# Patient Record
Sex: FEMALE | Race: WHITE | URBAN - METROPOLITAN AREA
[De-identification: names, ages, dates, MRNs, and addresses within clinical notes are randomized per-mention and may not be internally consistent; named-entity substitution may affect disease eponyms.]

---

## 2017-01-23 RX ORDER — CITALOPRAM HYDROBROMIDE 20 MG/1
TABLET ORAL
Qty: 90 TABLET | Refills: 1 | OUTPATIENT
Start: 2017-01-23

## 2017-01-23 NOTE — TELEPHONE ENCOUNTER
MD already sent in for a years worth back on 12/30/16 to same pharmacy  Confirmed pharmacy had it , and they do.     Tammi Sanches RN  January 23, 2017 9:37 AM

## 2017-05-18 DIAGNOSIS — Z53.9 ERRONEOUS ENCOUNTER--DISREGARD: Primary | ICD-10-CM

## 2017-05-18 DIAGNOSIS — F33.1 MAJOR DEPRESSIVE DISORDER, RECURRENT EPISODE, MODERATE (H): ICD-10-CM

## 2017-05-25 NOTE — TELEPHONE ENCOUNTER
citalopram (CELEXA) 20 MG tablet     Last Written Prescription Date: 12/30/2016  Last Fill Quantity: 90, # refills: 3  Last Office Visit with [facility] primary care provider:  12/30/2017 with Dr Rios        Last PHQ-9 score on record=   PHQ-9 SCORE 1/2/2017   Total Score -   Total Score 0

## 2017-06-02 ENCOUNTER — OFFICE VISIT (OUTPATIENT)
Dept: FAMILY MEDICINE | Facility: CLINIC | Age: 32
End: 2017-06-02

## 2017-06-02 VITALS
HEART RATE: 83 BPM | WEIGHT: 127 LBS | BODY MASS INDEX: 19.65 KG/M2 | SYSTOLIC BLOOD PRESSURE: 105 MMHG | DIASTOLIC BLOOD PRESSURE: 72 MMHG | OXYGEN SATURATION: 97 %

## 2017-06-02 DIAGNOSIS — Z23 IMMUNIZATION DUE: ICD-10-CM

## 2017-06-02 DIAGNOSIS — N63.20 LUMP OF BREAST, LEFT: ICD-10-CM

## 2017-06-02 DIAGNOSIS — Z20.2 EXPOSURE TO STD: Primary | ICD-10-CM

## 2017-06-02 LAB — HIV RAPID ABY SCREEN: NEGATIVE

## 2017-06-02 ASSESSMENT — PAIN SCALES - GENERAL: PAINLEVEL: NO PAIN (0)

## 2017-06-02 NOTE — NURSING NOTE
Chief Complaint   Patient presents with     STD     patient would like testing     Breast Mass     left breast     32 year old      Blood pressure 105/72, pulse 83, weight 127 lb (57.6 kg), last menstrual period 05/27/2017, SpO2 97 %. Body mass index is 19.65 kg/(m^2).    Wt Readings from Last 2 Encounters:   06/02/17 127 lb (57.6 kg)   12/30/16 129 lb (58.5 kg)     BP Readings from Last 3 Encounters:   06/02/17 105/72   12/30/16 109/75   07/29/16 107/69       Patient Active Problem List   Diagnosis     Menorrhagia     Constipation     Seasonal allergic rhinitis     External hemorrhoids     TMJ (temporomandibular joint syndrome)       Current Outpatient Prescriptions   Medication Sig Dispense Refill     citalopram (CELEXA) 20 MG tablet Take 1 tablet (20 mg) by mouth daily 90 tablet 3     Multiple Vitamin (MULTI VITAMIN DAILY PO)        Lactobacillus (ACIDOPHILUS PO)          Social History   Substance Use Topics     Smoking status: Never Smoker     Smokeless tobacco: Not on file     Alcohol use Yes      Comment: socially         Health Maintenance Due   Topic Date Due     MAMMO Q2 YR  08/11/2016     PHQ-9 Q6 MONTHS  06/30/2017       AFSANEH HUGO CMA  June 2, 2017 2:56 PM

## 2017-06-02 NOTE — PROGRESS NOTES
Sonya is a 32 year old female who presents to [facility] for STD screening  1)  STD check - couple new partners. No vaginal symptoms  2)  Major depression: On citalopram 20 mg and feels good.  3)  Left breast fibroadenma on US.  Last imaging done 2014 and at that time advised 12 month follow-up but no imaging has been todate.  ROS:   General: depression is much better  Head/Eyes: none  Ears/Nose/Throat: drainage.   Breast: hx of fibroadenomas  Genitourinary: depends on what and how she eats.   Sexual Function: none  Endocrine: none  PROBLEM LIST:  Patient Active Problem List   Diagnosis     Menorrhagia     Constipation     Seasonal allergic rhinitis     External hemorrhoids     TMJ (temporomandibular joint syndrome)   OB/GYN HISTORY:   G0  LMP: regular  No contraception  No history of abnormal pap.   PAST MEDICAL HISTORY:  Past Medical History:   Diagnosis Date     Depression      Fibroadenoma of breast 2009    left breast   Life Style Modifiers:   Tobacco:  reports that she has never smoked. She does not have any smokeless tobacco history on file.   Alcohol:  reports that she drinks alcohol.   Drug use:  reports that she does not use illicit drugs.  Exercise: no structure to days         Diet: good [hard to keep on a schedule]  Supplements: no probiotics [hard to get in a routine]  PAST SURGICAL HISTORY:  Past Surgical History:   Procedure Laterality Date     pectoralis excavation  2002, 2009     TONSILLECTOMY  2009   FAMILY HISTORY:  Family History   Problem Relation Age of Onset     Coronary Artery Disease Paternal Grandmother    SOCIAL HISTORY:  Artist. Contract. Mostly puppeterring. Will go to [place] this fall and visit a friend.    MEDICATIONS:  Current Outpatient Prescriptions   Medication Sig Dispense Refill     citalopram (CELEXA) 20 MG tablet Take 1 tablet (20 mg) by mouth daily 90 tablet 3     Multiple Vitamin (MULTI VITAMIN DAILY PO)        Lactobacillus (ACIDOPHILUS PO)      ALLERGIES:  Review of patient's  allergies indicates no known allergies.  VITALS:  /72  Pulse 83  Wt 127 lb (57.6 kg)  LMP 05/27/2017  SpO2 97%  BMI 19.65 kg/m2  PHYSICAL EXAM:  Well appearing woman  Left breast with two palpable lumps.  [see US from 8/2014]   GYN: external exam normal.  No vaginal discharge or odor.   Diagnoses and associated orders for this visit:  Exposure to STD  -     NEISSERIA GONORRHOEA PCR  -     CHLAMYDIA TRACHOMATIS PCR  -     HIV Rapid ([facility])  Immunization due: start series for Hep A  -     HEPA VACCINE ADULT IM #1  -     Second dose in six month  Lump of breast, left likely fibroademoas [seen previously on US}  -     MA Diagnostic Bilateral w/Chirag; Future  -     US Breast Left Complete 4 Quadrants; Future

## 2017-06-02 NOTE — NURSING NOTE
Screening Questionnaire for Adult Immunization    Are you sick today?   No   Do you have allergies to medications, food, a vaccine component or latex?   No   Have you ever had a serious reaction after receiving a vaccination?   No   Do you have a long-term health problem with heart disease, lung disease, asthma, kidney disease, metabolic disease (e.g. diabetes), anemia, or other blood disorder?   No   Do you have cancer, leukemia, HIV/AIDS, or any other immune system problem?   No   In the past 3 months, have you taken medications that affect  your immune system, such as prednisone, other steroids, or anticancer drugs; drugs for the treatment of rheumatoid arthritis, Crohn s disease, or psoriasis; or have you had radiation treatments?   No   Have you had a seizure, or a brain or other nervous system problem?   No   During the past year, have you received a transfusion of blood or blood     products, or been given immune (gamma) globulin or antiviral drug?   No   For women: Are you pregnant or is there a chance you could become        pregnant during the next month?   No   Have you received any vaccinations in the past 4 weeks?   No     Immunization questionnaire answers were all negative.      MNVFC doesn't apply on this patient    Per orders of Dr. Rios, injection of Hepatits A given by AFSANEH HUGO. Patient instructed to remain in clinic for 20 minutes afterwards, and to report any adverse reaction to me immediately.       Screening performed by AFSANEH HUGO on 6/2/2017 at 3:38 PM.

## 2017-06-02 NOTE — MR AVS SNAPSHOT
After Visit Summary   6/2/2017    Sonya Argueta    MRN: [id number]           Patient Information     Date Of Birth          1985        Visit Information        Provider Department      6/2/2017 3:00 PM Jolly Rios MD [facility]        Today's Diagnoses     Exposure to STD    -  1    Immunization due          Care Instructions    Second Hep A #2 in six months    Call for mammogram and Left  - [phone]          Follow-ups after your visit        Who to contact     Please call your clinic at [phone] to:    Ask questions about your health    Make or cancel appointments    Discuss your medicines    Learn about your test results    Speak to your doctor   If you have compliments or concerns about an experience at your clinic, or if you wish to file a complaint, please contact [facility] Patient Relations at [phone] or email us at [email or web address]         Additional Information About Your Visit        MyChart Information     [employer] gives you secure access to your electronic health record. If you see a primary care provider, you can also send messages to your care team and make appointments. If you have questions, please call your primary care clinic.  If you do not have a primary care provider, please call [phone] and they will assist you.      [employer] is an electronic gateway that provides easy, online access to your medical records. With [employer], you can request a clinic appointment, read your test results, renew a prescription or communicate with your care team.     To access your existing account, please contact your [facility] or call [phone] for assistance.        Care EveryWhere ID     This is your Care EveryWhere ID. This could be used by other organizations to access your [facility] medical records  [id number]        Your Vitals Were     Pulse Last Period Pulse Oximetry BMI  (Body Mass Index)          83 05/27/2017 97% 19.65 kg/m2         Blood Pressure from Last 3 Encounters:   06/02/17 105/72   12/30/16 109/75   07/29/16 107/69    Weight from Last 3 Encounters:   06/02/17 127 lb (57.6 kg)   12/30/16 129 lb (58.5 kg)   07/29/16 127 lb (57.6 kg)              We Performed the Following     CHLAMYDIA TRACHOMATIS PCR     HEPA VACCINE ADULT IM     HIV Rapid ([facility])     NEISSERIA GONORRHOEA PCR          Today's Medication Changes          These changes are accurate as of: 6/2/17  3:23 PM.  If you have any questions, ask your nurse or doctor.               Stop taking these medicines if you haven't already. Please contact your care team if you have questions.     SUDAFED PO   Stopped by:  Jolly Rios MD                    Primary Care Provider Office Phone # Fax #    Jolly Rios [phone]       [facility] [address] [place], [place] [id number]        Thank you!     Thank you for choosing [facility]  for your care. Our goal is always to provide you with excellent care. Hearing back from our patients is one way we can continue to improve our services. Please take a few minutes to complete the written survey that you may receive in the mail after your visit with us. Thank you!             Your Updated Medication List - Protect others around you: Learn how to safely use, store and throw away your medicines at www.disposemymeds.org.          This list is accurate as of: 6/2/17  3:23 PM.  Always use your most recent med list.                   Brand Name Dispense Instructions for use    ACIDOPHILUS PO          citalopram 20 MG tablet    celeXA    90 tablet    Take 1 tablet (20 mg) by mouth daily       MULTI VITAMIN DAILY PO

## 2017-06-04 LAB
C TRACH DNA SPEC QL NAA+PROBE: NORMAL
N GONORRHOEA DNA SPEC QL NAA+PROBE: NORMAL
SPECIMEN SOURCE: NORMAL
SPECIMEN SOURCE: NORMAL

## 2017-11-17 RX ORDER — CITALOPRAM HYDROBROMIDE 20 MG/1
TABLET ORAL
Qty: 90 TABLET | Refills: 2 | OUTPATIENT
Start: 2017-11-17

## 2017-11-17 NOTE — TELEPHONE ENCOUNTER
This encounter was opened in error. Please disregard.    A 5 month old request coming to us form [place],  No need for this refill now.     Tammi Sanches RN  November 17, 2017 9:35 AM

## 2017-12-21 NOTE — PROGRESS NOTES
Sonya is a 33 year old female who presents to [facility] to discuss medications, contraception and immunizations:    Wondering about OCP's. Has used the Nuvaring [felt more emotional]. Need contraception as she plans to join her boyfriend in [place] in mid-January. Has considered an IUD but reluctant.     Major depression: On citalopram 20 mg and feels good. Forget 1-2 pills without effects and wondering about going off medication.  Will go to [place] Jan 19 for 2.5 months.   ROS:   General: depression is stable  Head/Eyes: none  Ears/Nose/Throat: drainage.   Breast: hx of fibroadenomas  Genitourinary: depends on what and how she eats.   Sexual Function: none  Endocrine: none  PROBLEM LIST:  Patient Active Problem List   Diagnosis     Menorrhagia     Constipation     Seasonal allergic rhinitis     External hemorrhoids     TMJ (temporomandibular joint syndrome)   OB/GYN HISTORY:   G0  LMP: 12.17.2017.   No contraception  No history of abnormal pap.   PAST MEDICAL HISTORY:  Past Medical History:   Diagnosis Date     Depression      Fibroadenoma of breast 2009    left breast   Life Style Modifiers:   Tobacco:  reports that she has never smoked. She does not have any smokeless tobacco history on file.   Alcohol:  reports that she drinks alcohol.   Drug use:  reports that she does not use illicit drugs.  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    MEDICATIONS:  Current Outpatient Prescriptions   Medication Sig Dispense Refill     citalopram (CELEXA) 20 MG tablet Take 1 tablet (20  "mg) by mouth daily 90 tablet 3     Multiple Vitamin (MULTI VITAMIN DAILY PO)        Lactobacillus (ACIDOPHILUS PO)      ALLERGIES:  Review of patient's allergies indicates no known allergies.  VITALS:  /76  Pulse 80  Temp 97.5  F (36.4  C) (Oral)  Ht 1.712 m (5' 7.4\")  Wt 62.3 kg (137 lb 4 oz)  LMP 12/19/2017  SpO2 96%  BMI 21.24 kg/m2  PHYSICAL EXAM:  Well appearing woman  PHYSICAL EXAM:  Constitutional: Well appearing woman in no acute distress.   Psychological: appropriate mood.  Eyes: anicteric, normal extra-ocular movements,    Musculoskeletal: full range of motion    Skin: no concerning lesions, no jaundice.  Neurological: normal gait, no tremor.     Diagnoses and associated orders for this visit  Major depression in complete remission (H)        - discouraged going off Celexa at this time.  Could consider in the spring after return from [place].  Discuss wean of 10 mg X one month then 10 mg every other day X one month and if no break-through symptoms could disoontinue.  Encounter for initial prescription of contraceptive pills         -  RX for omid sent.          -  Discussed risks and benefits of OCP's along with DVT risk and start date.  Immunization due: second dose of for Hep A  -     HEPA VACCINE ADULT IM #2 govem  -     Second dose in six month  Multiple Moles        -      Advised skin scan with dermatology names given       "

## 2017-12-22 ENCOUNTER — OFFICE VISIT (OUTPATIENT)
Dept: FAMILY MEDICINE | Facility: CLINIC | Age: 32
End: 2017-12-22
Payer: COMMERCIAL

## 2017-12-22 VITALS
BODY MASS INDEX: 21.54 KG/M2 | OXYGEN SATURATION: 96 % | SYSTOLIC BLOOD PRESSURE: 122 MMHG | HEIGHT: 67 IN | WEIGHT: 137.25 LBS | DIASTOLIC BLOOD PRESSURE: 76 MMHG | TEMPERATURE: 97.5 F | HEART RATE: 80 BPM

## 2017-12-22 DIAGNOSIS — Z23 NEED FOR VACCINATION: ICD-10-CM

## 2017-12-22 DIAGNOSIS — F32.5 MAJOR DEPRESSION IN COMPLETE REMISSION (H): Primary | ICD-10-CM

## 2017-12-22 DIAGNOSIS — Z30.011 ENCOUNTER FOR INITIAL PRESCRIPTION OF CONTRACEPTIVE PILLS: ICD-10-CM

## 2017-12-22 RX ORDER — LEVONORGESTREL/ETHIN.ESTRADIOL 0.1-0.02MG
1 TABLET ORAL DAILY
Qty: 120 TABLET | Refills: 3 | Status: SHIPPED | OUTPATIENT
Start: 2017-12-22 | End: 2019-11-21

## 2017-12-22 ASSESSMENT — PATIENT HEALTH QUESTIONNAIRE - PHQ9: SUM OF ALL RESPONSES TO PHQ QUESTIONS 1-9: 1

## 2017-12-22 NOTE — MR AVS SNAPSHOT
After Visit Summary   12/22/2017    Sonya Argueta    MRN: [id number]           Patient Information     Date Of Birth          1985        Visit Information        Provider Department      12/22/2017 2:00 PM Jolly Rios MD [facility]        Today's Diagnoses     Major depression in complete remission (H)    -  1    Encounter for initial prescription of contraceptive pills          Care Instructions    [place] [facility]: Velma Cheung, [phone]    [facility]: [place] [phone] [Hailey Chinchilla          Follow-ups after your visit        Who to contact     Please call your clinic at [phone] to:    Ask questions about your health    Make or cancel appointments    Discuss your medicines    Learn about your test results    Speak to your doctor   If you have compliments or concerns about an experience at your clinic, or if you wish to file a complaint, please contact [facility] Patient Relations at [phone] or email us at [email or web address]         Additional Information About Your Visit        MyChart Information     [employer] gives you secure access to your electronic health record. If you see a primary care provider, you can also send messages to your care team and make appointments. If you have questions, please call your primary care clinic.  If you do not have a primary care provider, please call [phone] and they will assist you.      [email or web address] is an electronic gateway that provides easy, online access to your medical records. With [email or web address], you can request a clinic appointment, read your test results, renew a prescription or communicate with your care team.     To access your existing account, please contact your [facility] or call [phone] for assistance.        Care EveryWhere ID     This is your Care EveryWhere ID. This could be used by other  "organizations to access your [facility] medical records  [id number]        Your Vitals Were     Pulse Temperature Height Last Period Pulse Oximetry BMI (Body Mass Index)    80 97.5  F (36.4  C) (Oral) 5' 7.4\" (171.2 cm) 12/19/2017 96% 21.24 kg/m2       Blood Pressure from Last 3 Encounters:   12/22/17 122/76   06/02/17 105/72   12/30/16 109/75    Weight from Last 3 Encounters:   12/22/17 137 lb 4 oz (62.3 kg)   06/02/17 127 lb (57.6 kg)   12/30/16 129 lb (58.5 kg)              Today, you had the following     No orders found for display         Today's Medication Changes          These changes are accurate as of: 12/22/17  2:28 PM.  If you have any questions, ask your nurse or doctor.               Start taking these medicines.        Dose/Directions    levonorgestrel-ethinyl estradiol 0.1-20 MG-MCG per tablet   Commonly known as:  AVIANE   Used for:  Encounter for initial prescription of contraceptive pills   Started by:  Jolly Rios MD        Dose:  1 tablet   Take 1 tablet by mouth daily   Quantity:  120 tablet   Refills:  3            Where to get your medicines      These medications were sent to [place] [address] [place] [id number]     Phone:  [phone]     levonorgestrel-ethinyl estradiol 0.1-20 MG-MCG per tablet                Primary Care Provider Office Phone # Fax #    Jolly Rios [phone]       [address] [place], [place] [address] [address] [address]        Equal Access to Services     [facility] AH: Hadii leidy patterson hadasho Soomaali, waaxda luqadaha, qaybta kaalmada adeegyavin, hayley tao. So [facility] [phone].    ATENCIÓN: Si habla español, tiene a song disposición servicios gratuitos de asistencia lingüística. Llame al [phone].    We comply with applicable federal civil rights laws and [place] laws. We do not discriminate on the basis of race, color, national origin, age, disability, " sex, sexual orientation, or gender identity.            Thank you!     Thank you for choosing [facility]  for your care. Our goal is always to provide you with excellent care. Hearing back from our patients is one way we can continue to improve our services. Please take a few minutes to complete the written survey that you may receive in the mail after your visit with us. Thank you!             Your Updated Medication List - Protect others around you: Learn how to safely use, store and throw away your medicines at www.disposemymeds.org.          This list is accurate as of: 12/22/17  2:28 PM.  Always use your most recent med list.                   Brand Name Dispense Instructions for use Diagnosis    ACIDOPHILUS PO       Constipation       citalopram 20 MG tablet    celeXA    90 tablet    Take 1 tablet (20 mg) by mouth daily    Major depressive disorder, recurrent episode, moderate (H)       levonorgestrel-ethinyl estradiol 0.1-20 MG-MCG per tablet    AVIANE    120 tablet    Take 1 tablet by mouth daily    Encounter for initial prescription of contraceptive pills       MULTI VITAMIN DAILY PO

## 2017-12-22 NOTE — PATIENT INSTRUCTIONS
[place] dermat[facility]: Velma Cheung, [phone]    [facility]: [place] [phone] [Hailey Chinchilla

## 2017-12-22 NOTE — NURSING NOTE
"32 year old  Chief Complaint   Patient presents with     Anxiety     recheck celexa medication     Contraception     pt would lke to dicuss the nuva ring     Imm/Inj     hep A       Blood pressure 122/76, pulse 80, temperature 97.5  F (36.4  C), temperature source Oral, height 5' 7.4\" (171.2 cm), weight 137 lb 4 oz (62.3 kg), last menstrual period 12/19/2017, SpO2 96 %. Body mass index is 21.24 kg/(m^2).  Patient Active Problem List   Diagnosis     Menorrhagia     Constipation     Seasonal allergic rhinitis     External hemorrhoids     TMJ (temporomandibular joint syndrome)       Wt Readings from Last 2 Encounters:   12/22/17 137 lb 4 oz (62.3 kg)   06/02/17 127 lb (57.6 kg)     BP Readings from Last 3 Encounters:   12/22/17 122/76   06/02/17 105/72   12/30/16 109/75         Current Outpatient Prescriptions   Medication     citalopram (CELEXA) 20 MG tablet     Multiple Vitamin (MULTI VITAMIN DAILY PO)     Lactobacillus (ACIDOPHILUS PO)     No current facility-administered medications for this visit.        Social History   Substance Use Topics     Smoking status: Never Smoker     Smokeless tobacco: Not on file     Alcohol use Yes      Comment: socially       Health Maintenance Due   Topic Date Due     MAMMO Q2 YR  08/11/2016     PHQ-9 Q6 MONTHS  06/30/2017     INFLUENZA VACCINE (SYSTEM ASSIGNED)  09/01/2017       Lab Results   Component Value Date    PAP NIL 06/10/2016         December 22, 2017 2:02 PM  "

## 2018-01-16 DIAGNOSIS — F33.1 MAJOR DEPRESSIVE DISORDER, RECURRENT EPISODE, MODERATE (H): ICD-10-CM

## 2018-01-16 RX ORDER — CITALOPRAM HYDROBROMIDE 20 MG/1
20 TABLET ORAL DAILY
Qty: 90 TABLET | Refills: 3 | Status: SHIPPED | OUTPATIENT
Start: 2018-01-16 | End: 2019-11-21

## 2018-01-16 NOTE — TELEPHONE ENCOUNTER
Last office visit: 12/22/2017, no future appointment.     Prescription approved per [facility] Refill Protocol.

## 2019-06-11 NOTE — PROGRESS NOTES
Sonya is a 34 year old female who presents to [facility] to discuss medications, contraception and immunizations:  Concerns:   1)   was seen recently at [facility] and treated for bacterial vaginosis -  recurrent using boric acid for a few days at the end of her cycle. Didn't like the oral flagyl: Not on a probiotic. Feels like her vaginal and Gut health are related.         -  Contraception is withdrawal/no contraception at this time    2)   Lots of moles; Wants a skin scan      - dermatology referral     3)  Needs to dentist appointment and hard to get in because [place] [facility]     ROS:   General: doing well   Head/Eyes: none  Ears/Nose/Throat: drainage.   Breast: hx of fibroadenomas  Genitourinary: depends on what and how she eats. Some IBS  Sexual Function: none  Endocrine: none   Mental health:  No depressive symptoms.  On medication   PROBLEM LIST:  Patient Active Problem List   Diagnosis     Menorrhagia     Constipation     Seasonal allergic rhinitis     External hemorrhoids     TMJ (temporomandibular joint syndrome)   OB/GYN HISTORY:   G0  LMP: regular   No contraception [withdrawal]  No history of abnormal pap. HPV/Pap negative 6/2016  PAST MEDICAL HISTORY:  Past Medical History:   Diagnosis Date     Depression      Fibroadenoma of breast 2009    left breast   Life Style Modifiers:   Tobacco:  reports that she has never smoked. She does not have any smokeless tobacco history on file.   Alcohol:  reports that she drinks alcohol.   Drug use:  reports that she does not use drugs.  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 Will go to [place] this fall and visit a friend.    MEDICATIONS:  Current Outpatient Medications   Medication Sig Dispense Refill     citalopram (CELEXA) 20 MG tablet Take 1 tablet (20 mg) by mouth daily 90 tablet 3     Lactobacillus (ACIDOPHILUS PO)        levonorgestrel-ethinyl estradiol (AVIANE) 0.1-20 MG-MCG per tablet Take 1 tablet by mouth daily 120 tablet 3     Multiple Vitamin (MULTI VITAMIN DAILY PO)      ALLERGIES:  Patient has no known allergies.  VITALS:  /67 (BP Location: Left arm, Patient Position: Chair, Cuff Size: Adult Regular)   Pulse 86   Temp 98.8  F (37.1  C) (Oral)   Ht 1.67 m (5' 5.75\")   Wt 58.7 kg (129 lb 8 oz)   LMP 06/03/2019   SpO2 95%   BMI 21.06 kg/m    PHYSICAL EXAM:  Well appearing woman  PHYSICAL EXAM:  Constitutional: Well appearing woman in no acute distress.   Psychological: appropriate mood.  Eyes: anicteric, normal extra-ocular movement   Ears, Nose and Throat: tympanic membranes clear,  Neck: No thyroidmegaly. No jugular venous distension, no carotid bruits.  Cardiovascular: regular rate and rhythm, normal S1 and S2, no murmurs, rubs or gallops, peripheral pulses full and symmetric   Breast: Symmetrical without visible distortion or swelling. Two lumps in left breast [documented on previous US]. No nipple inversion, no breast dimpling or puckering. Axillary area without masses or lympadenapathy.   Gastrointestinal: N/A   Musculoskeletal: full range of motion    Skin: no concerning lesions, no jaundice.  Neurological: normal gait, no tremor.   Diagnoses and associated orders for this visit:  Annual physical exam      - Pap/HPV completed 6/2016 - due 6/2021      - Prenatal with folic acid   Hx of bacterial vaginitis        - Omega 3 fatty 1-3 gm/day        - Vitamin D 1-2000/day        - Probiotic daily          - Boric Acid after menses   Major depression in complete remission (H)        - nothing at this time and mood is stable.   Multiple Moles        " -      Advised skin scan with dermatology names given   Fibrocystic breast changes with left breast lump        -      Mammogram and Left breast US is scheduled.  She will go to [facility] breast center for imaging  Encounter for contraceptive management, unspecified type        - would consider low dose OCP's - will call if wanting to start and I can RX at that time

## 2019-06-12 ENCOUNTER — OFFICE VISIT (OUTPATIENT)
Dept: FAMILY MEDICINE | Facility: CLINIC | Age: 34
End: 2019-06-12
Payer: COMMERCIAL

## 2019-06-12 VITALS
HEIGHT: 66 IN | HEART RATE: 86 BPM | DIASTOLIC BLOOD PRESSURE: 67 MMHG | SYSTOLIC BLOOD PRESSURE: 118 MMHG | TEMPERATURE: 98.8 F | WEIGHT: 129.5 LBS | OXYGEN SATURATION: 95 % | BODY MASS INDEX: 20.81 KG/M2

## 2019-06-12 DIAGNOSIS — B96.89 BACTERIAL VAGINOSIS: ICD-10-CM

## 2019-06-12 DIAGNOSIS — D22.9 ATYPICAL MOLE: ICD-10-CM

## 2019-06-12 DIAGNOSIS — N63.0 LUMP OR MASS IN BREAST: ICD-10-CM

## 2019-06-12 DIAGNOSIS — N64.4 BREAST PAIN: ICD-10-CM

## 2019-06-12 DIAGNOSIS — Z00.00 ANNUAL PHYSICAL EXAM: Primary | ICD-10-CM

## 2019-06-12 DIAGNOSIS — Z30.9 ENCOUNTER FOR CONTRACEPTIVE MANAGEMENT, UNSPECIFIED TYPE: ICD-10-CM

## 2019-06-12 DIAGNOSIS — N76.0 BACTERIAL VAGINOSIS: ICD-10-CM

## 2019-06-12 ASSESSMENT — MIFFLIN-ST. JEOR: SCORE: 1295.19

## 2019-06-12 ASSESSMENT — PATIENT HEALTH QUESTIONNAIRE - PHQ9: SUM OF ALL RESPONSES TO PHQ QUESTIONS 1-9: 1

## 2019-06-12 NOTE — NURSING NOTE
"34 year old  Chief Complaint   Patient presents with     Physical     34 yrs        Blood pressure 118/67, pulse 86, temperature 98.8  F (37.1  C), temperature source Oral, height 1.67 m (5' 5.75\"), weight 58.7 kg (129 lb 8 oz), last menstrual period 06/03/2019, SpO2 95 %. Body mass index is 21.06 kg/m .  Patient Active Problem List   Diagnosis     Menorrhagia     Constipation     Seasonal allergic rhinitis     External hemorrhoids     TMJ (temporomandibular joint syndrome)       Wt Readings from Last 2 Encounters:   06/12/19 58.7 kg (129 lb 8 oz)   12/22/17 62.3 kg (137 lb 4 oz)     BP Readings from Last 3 Encounters:   06/12/19 118/67   12/22/17 122/76   06/02/17 105/72         Current Outpatient Medications   Medication     citalopram (CELEXA) 20 MG tablet     Lactobacillus (ACIDOPHILUS PO)     levonorgestrel-ethinyl estradiol (AVIANE) 0.1-20 MG-MCG per tablet     Multiple Vitamin (MULTI VITAMIN DAILY PO)     No current facility-administered medications for this visit.        Social History     Tobacco Use     Smoking status: Never Smoker     Smokeless tobacco: Never Used   Substance Use Topics     Alcohol use: Yes     Comment: socially     Drug use: No       Health Maintenance Due   Topic Date Due     DEPRESSION ACTION PLAN  1984     HIV SCREENING  05/23/1999     MAMMO SCREENING  08/11/2016     PREVENTIVE CARE VISIT  06/10/2017     PHQ-9  06/22/2018     PAP  06/10/2019       Lab Results   Component Value Date    PAP NIL 06/10/2016         June 12, 2019 10:52 AM  "

## 2019-06-17 ENCOUNTER — ANCILLARY PROCEDURE (OUTPATIENT)
Dept: MAMMOGRAPHY | Facility: CLINIC | Age: 34
End: 2019-06-17
Attending: FAMILY MEDICINE
Payer: COMMERCIAL

## 2019-06-17 DIAGNOSIS — N64.4 BREAST PAIN: ICD-10-CM

## 2019-06-17 DIAGNOSIS — N63.0 LUMP OR MASS IN BREAST: ICD-10-CM

## 2019-07-09 ENCOUNTER — TRANSFERRED RECORDS (OUTPATIENT)
Dept: HEALTH INFORMATION MANAGEMENT | Facility: CLINIC | Age: 34
End: 2019-07-09

## 2019-07-22 ENCOUNTER — TRANSFERRED RECORDS (OUTPATIENT)
Dept: HEALTH INFORMATION MANAGEMENT | Facility: CLINIC | Age: 34
End: 2019-07-22

## 2019-11-09 ENCOUNTER — HEALTH MAINTENANCE LETTER (OUTPATIENT)
Age: 34
End: 2019-11-09

## 2019-11-21 ENCOUNTER — OFFICE VISIT (OUTPATIENT)
Dept: FAMILY MEDICINE | Facility: CLINIC | Age: 34
End: 2019-11-21
Payer: COMMERCIAL

## 2019-11-21 VITALS
WEIGHT: 135.75 LBS | RESPIRATION RATE: 14 BRPM | BODY MASS INDEX: 22.08 KG/M2 | DIASTOLIC BLOOD PRESSURE: 77 MMHG | HEART RATE: 91 BPM | OXYGEN SATURATION: 96 % | TEMPERATURE: 98.3 F | SYSTOLIC BLOOD PRESSURE: 114 MMHG

## 2019-11-21 DIAGNOSIS — J01.90 ACUTE RHINOSINUSITIS: Primary | ICD-10-CM

## 2019-11-21 RX ORDER — ALBUTEROL SULFATE 90 UG/1
2 AEROSOL, METERED RESPIRATORY (INHALATION) AT BEDTIME
COMMUNITY
Start: 2019-11-06 | End: 2020-10-30

## 2019-11-21 NOTE — PROGRESS NOTES
"  SUBJECTIVE:   Sonya Argueta is a 34 year old female who presents to clinic today for a return visit.    # Cold Symptoms  # Red Eye  - first sick 3 weeks ago  - developed a red eye about 2 weeks ago[facility] and an inhaler  - symptoms are improving but remains congested, particularly in the morning  - rhinorrhea  - ears feels \"plugged\"  - no sore throat  - coughing, not as severe  - still feels fatigued  - sinus pressure  - using mucinex  - no dyspnea    ROS: Denies fevers, chills, chest pain, difficulty breathing, abdominal pain    Patient Active Problem List   Diagnosis     Menorrhagia     Constipation     Seasonal allergic rhinitis     External hemorrhoids     TMJ (temporomandibular joint syndrome)     Current Outpatient Medications   Medication     albuterol (PROAIR HFA/PROVENTIL HFA/VENTOLIN HFA) 108 (90 Base) MCG/ACT inhaler     Lactobacillus (ACIDOPHILUS PO)     Multiple Vitamin (MULTI VITAMIN DAILY PO)     No current facility-administered medications for this visit.      I have reviewed the patient's relevant past medical history.     OBJECTIVE:   /77 (BP Location: Right arm, Patient Position: Sitting, Cuff Size: Adult Regular)   Pulse 91   Temp 98.3  F (36.8  C) (Oral)   Resp 14   Wt 61.6 kg (135 lb 12 oz)   SpO2 96%   BMI 22.08 kg/m      Constitutional: well-appearing, appears stated age  Eyes: conjunctivae without erythema, sclera anicteric.   ENT: TM normal bilateral. Posterior oropharynx erythematous but no cobblestoning. Mild medial maxillary sinus tenderness bilateral.   Cardiac: regular rate and rhythm, normal S1/S2, no murmur/rubs/gallops  Respiratory: lungs clear to auscultation bilaterally, normal work of breathing, no wheezes/crackles  Skin: no rashes, lesions, or wounds  Psych: affect is full and appropriate, speech is fluent and non-pressured    ASSESSMENT AND PLAN:     (J01.90) Acute rhinosinusitis  (primary encounter diagnosis)  Comment: " Symptoms consistent with acute rhinosinusitis. Conjunctivitis has resolved. Discussed difficulty distinguishing bacterial from viral rhinosinusitis and the risks/benefits of empiric antibiotic therapy. Sonya generally would like to avoid antibiotics and tends to be prone to yeast infections. Given that she has had some improvements in symptoms, it would be reasonable to continue to take a wait and see approach to her symptoms. Ok to call within the next week if not continuing to improve and would prescribe 10 days augmentin with probiotic. Advised neti pot, hydration, tea with honey.   Plan:     Benito Duque MD   [facility]  11/21/2019, 10:09 AM

## 2019-11-21 NOTE — NURSING NOTE
"Injectable Influenza Immunization Documentation    1.  Has the patient received the information for the injectable influenza vaccine? YES     2. Is the patient 6 months of age or older? YES     3. Does the patient have any of the following contraindications?         Severe allergy to eggs? No     Severe allergic reaction to previous influenza vaccines? No   Severe allergy to latex? No       History of Guillain-Preemption syndrome? No     Currently have a temperature greater than 100.4F? No        4.  Severely egg allergic patients should have flu vaccine eligibility assessed by an MD, RN, or pharmacist, and those who received flu vaccine should be observed for 15 min by an MD, RN, Pharmacist, Medical Technician, or member of clinic staff.\": YES    5. Latex-allergic patients should be given latex-free influenza vaccine Yes. Please reference the Vaccine latex table to determine if your clinic s product is latex-containing.       Vaccination given by Maria T Kothari CMA        "

## 2019-11-21 NOTE — NURSING NOTE
34 year old  Chief Complaint   Patient presents with     Nasal Congestion     x 2 1/2 weeks not getting better     Fatigue     cant stay awake during the day       Blood pressure 114/77, pulse 91, temperature 98.3  F (36.8  C), temperature source Oral, resp. rate 14, weight 61.6 kg (135 lb 12 oz), SpO2 96 %. Body mass index is 22.08 kg/m .  Patient Active Problem List   Diagnosis     Menorrhagia     Constipation     Seasonal allergic rhinitis     External hemorrhoids     TMJ (temporomandibular joint syndrome)       Wt Readings from Last 2 Encounters:   11/21/19 61.6 kg (135 lb 12 oz)   06/12/19 58.7 kg (129 lb 8 oz)     BP Readings from Last 3 Encounters:   11/21/19 114/77   06/12/19 118/67   12/22/17 122/76         Current Outpatient Medications   Medication     albuterol (PROAIR HFA/PROVENTIL HFA/VENTOLIN HFA) 108 (90 Base) MCG/ACT inhaler     Lactobacillus (ACIDOPHILUS PO)     Multiple Vitamin (MULTI VITAMIN DAILY PO)     No current facility-administered medications for this visit.        Social History     Tobacco Use     Smoking status: Never Smoker     Smokeless tobacco: Never Used   Substance Use Topics     Alcohol use: Yes     Comment: socially     Drug use: No       Health Maintenance Due   Topic Date Due     DEPRESSION ACTION PLAN  1984     HIV SCREENING  05/23/1999     INFLUENZA VACCINE (1) 09/01/2019     PHQ-9  12/12/2019       Lab Results   Component Value Date    PAP NIL 06/10/2016         November 21, 2019 9:40 AM

## 2020-10-28 NOTE — PROGRESS NOTES
"  Family Medicine Video Visit Note  Sonya Argueta is a 35 year old female who is being evaluated via a billable video visit.  Consent documented below.  Chief Complaint   Patient presents with     Anxiety     Sleep Problem     trouble staying asleep     The patient has been notified of following:     Can you please confirm what state you are currently located in? [place]  \"If you are located in a state other than [place] we cannot proceed with your visit.  This is due to state licensing laws that do not allow your provider to practice in another state.  This is not a billing or insurance issue. Please let me know if you need prescriptions filled or have other immediate concerns and I will get that message to your care team. I can also have you speak to our  to make an appointment when you are back in the [place].\"       Video Visit Consent     \"This video visit will be conducted via a call between you and your physician/provider. We have found that certain health care needs can be provided without the need for an in-person physical exam.  This service lets us provide the care you need with a video conversation.  If a prescription is necessary we can send it directly to your pharmacy.  If lab work is needed we can place an order for that and you can then stop by our lab to have the test done at a later time.    Video visits are billed at different rates depending on your insurance coverage.  Please reach out to your insurance provider with any questions.    If during the course of the call the physician/provider feels a video visit is not appropriate, you will not be charged for this service.\"    Patient has given verbal consent for Video visit? Yes  How would you like to obtain your AVS? MyChart  If you are dropped from the video visit, the video invite should be resent to: Send to e-mail at: [email or web address]  Will anyone else be joining your video visit? No     ASK patient if they have " "taken their temperature and or blood pressure today --if yes enter into vitals under \"patient reported\".  {If patient encounters technical issues they should call [phone] :      Video Start Time: 4:29 PM  Sonya Argueta is a 35 year old female who presents to clinic today for the following health issues:    # Anxiety  - sleeping patterns have been \"frustrating [her] the most\"  - doesn't feel that anxiety and depression are as \"out of control\" as they have been in the past  - worries that her worsening anxiety is going to develop into a depression  - \"I can't shut off of my brain\"  - \"It feels like a coat I'm always wearing\" - symptoms are throughout the day, most bothersome at night  - \"I get a lump in my throat easily\"  - not interfering with work yet    - doesn't smoke  - drinks alcohol about one beer a day at most (maybe 4 in a week)  - uses marijuana about 3 nights a week which helps with sleep, thinks CBD might help more  - one cup of caffeinated beverage a day, makes her more irritable, thinking of cutting it out  - goes for a walk a day, good pace, 2 miles  - sleeps with partner, this is unchanged, relationship going well  - not currently working with a therapist, has in the past    - goes [place] around 11 or [place]  - often falls asleep on the couch and then moves to bedroom  - usually wakes up at 4am, has racing thoughts, goes back to couch and watch some TV  - falls asleep again 2 hours later  - seems to help to leave TV on low in background  - gets up at 8 if sleeps throat night. If restless, will sleep in until 9-9:30.   - taking diphenhydramine 3 nights a week before days where she needs to be more productive, helps when she takes it    - has had this issue with waking up at night    - has previously been on citalopram for MDD and anxiety  - most recently weaned herself off of this a couple of years ago    PHQ 12/22/2017 6/12/2019 10/30/2020   PHQ-9 Total Score 1 1 9   Q9: Thoughts of better off " dead/self-harm past 2 weeks Not at all Not at all Not at all     JOYA-7 SCORE 5/21/2015 1/2/2017 10/30/2020   Total Score 1 - -   Total Score - - 21 (severe anxiety)   Total Score - 0 21           Patient Active Problem List   Diagnosis     Menorrhagia     Constipation     Seasonal allergic rhinitis     External hemorrhoids     TMJ (temporomandibular joint syndrome)     Past Surgical History:   Procedure Laterality Date     pectoralis excavation  2002, 2009     TONSILLECTOMY  2009       Social History     Tobacco Use     Smoking status: Never Smoker     Smokeless tobacco: Never Used   Substance Use Topics     Alcohol use: Yes     Comment: socially     Family History   Problem Relation Age of Onset     Coronary Artery Disease Paternal Grandmother          Current Outpatient Medications   Medication Sig Dispense Refill     Ascorbic Acid (VITAMIN C) 100 MG CHEW        boric acid 600 mg vaginal suppository - PHARMACY TO MIX COMPOUND Place 600 mg vaginally       Multiple Vitamin (MULTI VITAMIN DAILY PO)        Vitamin D3 (CHOLECALCIFEROL) 25 mcg (1000 units) tablet Take by mouth daily       zinc gluconate 50 MG tablet Take 50 mg by mouth daily       albuterol (PROAIR HFA/PROVENTIL HFA/VENTOLIN HFA) 108 (90 Base) MCG/ACT inhaler Inhale 2 puffs into the lungs At Bedtime       Lactobacillus (ACIDOPHILUS PO)          Reviewed and updated as needed this visit by Provider       Review of Systems   CONSTITUTIONAL: NEGATIVE for fever, chills, change in weight  ENT/MOUTH: NEGATIVE for ear, mouth and throat problems  RESP: NEGATIVE for significant cough or SOB  CV: NEGATIVE for chest pain, palpitations or peripheral edema      Objective     There were no vitals taken for this visit.  Vitals - Patient Reported  Weight (Patient Reported): 61.7 kg (136 lb)        Physical Exam     GENERAL: Healthy, alert and no distress  EYES: Eyes grossly normal to inspection.  No discharge or erythema, or obvious scleral/conjunctival  abnormalities.  RESP: No audible wheeze, cough, or visible cyanosis.  No visible retractions or increased work of breathing.    SKIN: Visible skin clear. No significant rash, abnormal pigmentation or lesions.  NEURO: Cranial nerves grossly intact.  Mentation and speech appropriate for age.  PSYCH: Mentation appears normal, affect normal/bright, judgement and insight intact, normal speech and appearance well-groomed.      Diagnostic Test Results:  Labs reviewed in Epic        Assessment & Plan     (F41.1) JOYA (generalized anxiety disorder)  (primary encounter diagnosis)  Comment: Symptoms consistent with worsening JOYA leading to worsening insomnia. Counseled on sleep hygiene. Nicole does not currently feel that she is at a place where she needs a regular therapist in her life, and I am inclined to agree as long as we can get her anxiety back under control. Discussed pharmacotherapy options including sleep-only approach vs treating anxiety and sleep. Felt that anxiety was well controlled when taking Celexa and other than some early jaw-clenching, she tolerated it well. Will restart Celexa, 10mg daily for 8 days, then 20mg thereafter if tolerating. We discussed a bridging course of benzodiazepines or a Z-drug for sleep while initiating SSRI. Nicole wants to try melatonin first, which is a great idea. She will message me if ineffective.  Follow up 4-6 weeks after getting to 20mg dose of celexa, sooner if needed.   Plan: citalopram (CELEXA) 20 MG tablet          (F51.04) Psychophysiological insomnia  Comment:   Plan:   Benito Duque MD  [facility]      Video-Visit Details    Type of service:  Video Visit    Video End Time:4:52 PM    Originating Location (pt. Location): Home    Distant Location (provider location):  [facility]   Platform used for Video Visit: [facility]      Benito Duque MD

## 2020-10-30 ENCOUNTER — VIRTUAL VISIT (OUTPATIENT)
Dept: FAMILY MEDICINE | Facility: CLINIC | Age: 35
End: 2020-10-30
Payer: COMMERCIAL

## 2020-10-30 DIAGNOSIS — F41.1 GAD (GENERALIZED ANXIETY DISORDER): Primary | ICD-10-CM

## 2020-10-30 DIAGNOSIS — F51.04 PSYCHOPHYSIOLOGICAL INSOMNIA: ICD-10-CM

## 2020-10-30 RX ORDER — VITAMIN B COMPLEX
TABLET ORAL DAILY
COMMUNITY

## 2020-10-30 RX ORDER — ZINC GLUCONATE 50 MG
50 TABLET ORAL DAILY
COMMUNITY

## 2020-10-30 RX ORDER — CITALOPRAM HYDROBROMIDE 20 MG/1
TABLET ORAL
Qty: 30 TABLET | Refills: 1 | Status: SHIPPED | OUTPATIENT
Start: 2020-10-30 | End: 2021-03-01

## 2020-10-30 RX ORDER — ASCORBIC ACID 100 MG
TABLET,CHEWABLE ORAL
COMMUNITY

## 2020-12-06 ENCOUNTER — HEALTH MAINTENANCE LETTER (OUTPATIENT)
Age: 35
End: 2020-12-06

## 2020-12-08 ENCOUNTER — TELEPHONE (OUTPATIENT)
Dept: FAMILY MEDICINE | Facility: CLINIC | Age: 35
End: 2020-12-08

## 2020-12-08 NOTE — TELEPHONE ENCOUNTER
"Pt calling clinic states she tested positive for covid 3 weeks ago - [place]. States she experienced mild fever, lost taste and smell, fatigue; chest congestion without cough. States she has quarantined x 2 weeks.  Has gone back to work - is complaining of back pain - thinks this is due to repetitive work she is doing; also some chest tightness, and \" I seem to get more winded\" with activity.   She requests in person visit in clinic for eval. States she remains afebrile, does not have covid symptoms other than the occ chest tightness.  Appt with Dr Duque for Thursday.  Sarah Andrade RN  [facility]  "

## 2020-12-09 NOTE — PROGRESS NOTES
"  SUBJECTIVE:   Nicole is a 35 year old female who presents to clinic today for a return visit.      # Post-COVID Symptoms:    Chest Tightness and SOB   -Patient has a history of bad chest colds that lead to long coughing spells    She has had an inhaler in the past to helpwith lingering cough symptoms   -Patient is in recovery from COVID-19   When she had COVID she had congestion but did not really have a cough    She remains slightly more fatigued  -Last week, she noted the constant presence of chest tightness  -She describes it as a feeling that her \"normal lung capacity is down\"   -It has not been getting worse, but is just concering because of the way it feels   -When she takes a deep breath she can feel tightenin in her chest and back that dont let he expand fully   -She then will have some SOB and reports this is probably due to anxiery with her breathing experience not feeling normal  -symptoms are better in the AM and worse in the eveniing   -Nothing really makes it worse---It is constant and unchanging   -Strecthing makes it better  -She has been able to bike   She notes breathing a bit harder when she exercises   -No history of asthma or allergies or eczema     Back pain [between the shoulder blades]  -Started 4 weeks ago and has gotten worse   -She notes that the pain is associated with stress headaches   -Constant but better in the morning   -She has been working on a time-lapse project that had her in a hunched over position for a long time    She also spends a lot of time bent over a computer   -Back pain is worse with deep breathing   -Stretching helps      JOYA  -Her last visit for this was 10/30/20, at that time started on citalopram 20mg daily  -Sleeping a lot better---from 11 PM to 9/10 AM   -She has had some clenching of her teeth   -Denies diarrhea, nausea  -No sexual side effects   -Not with therapist at this time, but open to it     JOYA-7 SCORE 5/21/2015 1/2/2017 10/30/2020   Total Score 1 - - "   Total Score - - 21 (severe anxiety)   Total Score - 0 21     PHQ 12/22/2017 6/12/2019 10/30/2020   PHQ-9 Total Score 1 1 9   Q9: Thoughts of better off dead/self-harm past 2 weeks Not at all Not at all Not at all     ROS: Denies fevers, chills, abdominal pain    Patient Active Problem List   Diagnosis     Menorrhagia     Constipation     Seasonal allergic rhinitis     External hemorrhoids     TMJ (temporomandibular joint syndrome)     Current Outpatient Medications   Medication     boric acid 600 mg vaginal suppository - PHARMACY TO MIX COMPOUND     citalopram (CELEXA) 20 MG tablet     Vitamin D3 (CHOLECALCIFEROL) 25 mcg (1000 units) tablet     Ascorbic Acid (VITAMIN C) 100 MG CHEW     Multiple Vitamin (MULTI VITAMIN DAILY PO)     zinc gluconate 50 MG tablet     No current facility-administered medications for this visit.      I have reviewed the patient's relevant past medical history.     OBJECTIVE:   /74 (BP Location: Right arm, Patient Position: Sitting, Cuff Size: Adult Regular)   Pulse 75   Temp 98.1  F (36.7  C) (Skin)   Resp 15   Wt 61.8 kg (136 lb 4 oz)   LMP 11/23/2020 (Within Days)   SpO2 95%   BMI 22.16 kg/m      Constitutional: well-appearing, appears stated age  Eyes: conjunctivae without erythema, sclera anicteric.   Cardiac: regular rate and rhythm, normal S1/S2, no murmur/rubs/gallops  Respiratory: lungs clear to auscultation bilaterally, normal work of breathing, no wheezes/crackles  Skin: no rashes, lesions, or wounds  Psych: affect is full and appropriate, speech is fluent and non-pressured  Msk: No point tendenress     ASSESSMENT AND PLAN:     (R07.89) Chest tightness  (primary encounter diagnosis)  Comment: With chest tightness in this patient things that I am thinking it could be are residual COVID symptoms, pneumonia, pericarditis, PE. Based on her completely normal exam and the fact that she is presenting afebrile and in no distress, I am less worried about pericarditis, PE  and a pneumonia. I am leaning in the direction that this is a residual effect of COVID.   Plan:  -Referral to COVID rehab program     (M54.89) Other back pain, unspecified chronicity  Comment: The patient's back pain is cornering for a msk etiology such as strain, especially with the addition of her stress headache. I presume it could be from the extended awkward positioning for her friends project and with work being hunched over a computer all day.   Plan:   -Have the patient continue to stretch and use ice or heat    (F41.1) JOYA (generalized anxiety disorder)  Comment: Managed well on Citalopram and sleeping well  Plan:   -Continue with medication and watch for side effects  -Referral to Therapy     Kiel Bishop  [facility]  12/11/2020, 5:11 PM     I was present with the medical student who participated in the service and in the documentation of the note. I have verified the history and personally performed the physical exam and medical decision making. I agree with the assessment and plan of care as documented in the note with the following additions:   Agree with above plan for chest tightness and back pain. Low suspicion for alternative, serious pathology given the lack of impact on her daily activities, normal vitals, normal exam. I think the COVID-19 rehab program is a great idea.  Anxiety and specifically sleep has improved with citalopram, will continue at this dose. We discussed therapy and whether it would be beneficial at this time (Nicole doesn't think so). Hold off for now.     Benito Duque MD  5:11 PM, December 11, 2020

## 2020-12-10 ENCOUNTER — OFFICE VISIT (OUTPATIENT)
Dept: FAMILY MEDICINE | Facility: CLINIC | Age: 35
End: 2020-12-10
Payer: COMMERCIAL

## 2020-12-10 VITALS
WEIGHT: 136.25 LBS | BODY MASS INDEX: 22.16 KG/M2 | OXYGEN SATURATION: 95 % | TEMPERATURE: 98.1 F | DIASTOLIC BLOOD PRESSURE: 74 MMHG | HEART RATE: 75 BPM | RESPIRATION RATE: 15 BRPM | SYSTOLIC BLOOD PRESSURE: 111 MMHG

## 2020-12-10 DIAGNOSIS — F41.1 GAD (GENERALIZED ANXIETY DISORDER): ICD-10-CM

## 2020-12-10 DIAGNOSIS — R07.89 CHEST TIGHTNESS: Primary | ICD-10-CM

## 2020-12-10 DIAGNOSIS — M54.89 OTHER BACK PAIN, UNSPECIFIED CHRONICITY: ICD-10-CM

## 2020-12-10 NOTE — NURSING NOTE
35 year old  Chief Complaint   Patient presents with     Covid Concern     post covid care and immunity     Back Pain     chest tightness       Blood pressure 111/74, pulse 75, temperature 98.1  F (36.7  C), temperature source Skin, resp. rate 15, weight 61.8 kg (136 lb 4 oz), last menstrual period 11/23/2020, SpO2 95 %. Body mass index is 22.16 kg/m .  Patient Active Problem List   Diagnosis     Menorrhagia     Constipation     Seasonal allergic rhinitis     External hemorrhoids     TMJ (temporomandibular joint syndrome)       Wt Readings from Last 2 Encounters:   12/10/20 61.8 kg (136 lb 4 oz)   11/21/19 61.6 kg (135 lb 12 oz)     BP Readings from Last 3 Encounters:   12/10/20 111/74   11/21/19 114/77   06/12/19 118/67         Current Outpatient Medications   Medication     boric acid 600 mg vaginal suppository - PHARMACY TO MIX COMPOUND     citalopram (CELEXA) 20 MG tablet     Vitamin D3 (CHOLECALCIFEROL) 25 mcg (1000 units) tablet     Ascorbic Acid (VITAMIN C) 100 MG CHEW     Multiple Vitamin (MULTI VITAMIN DAILY PO)     zinc gluconate 50 MG tablet     No current facility-administered medications for this visit.        Social History     Tobacco Use     Smoking status: Never Smoker     Smokeless tobacco: Never Used   Substance Use Topics     Alcohol use: Yes     Comment: socially     Drug use: No       Health Maintenance Due   Topic Date Due     DEPRESSION ACTION PLAN  1984     HIV SCREENING  05/23/1999     HEPATITIS C SCREENING  05/23/2002     PREVENTIVE CARE VISIT  06/12/2020     HPV TEST  06/10/2021     PAP  06/10/2021       Lab Results   Component Value Date    PAP NIL 06/10/2016         December 10, 2020 9:56 AM

## 2020-12-11 PROBLEM — F41.1 GAD (GENERALIZED ANXIETY DISORDER): Status: ACTIVE | Noted: 2020-12-11

## 2021-01-08 DIAGNOSIS — F41.1 GAD (GENERALIZED ANXIETY DISORDER): ICD-10-CM

## 2021-01-08 RX ORDER — CITALOPRAM HYDROBROMIDE 20 MG/1
TABLET ORAL
Qty: 30 TABLET | Refills: 1 | Status: CANCELLED | OUTPATIENT
Start: 2021-01-08 | End: 2021-02-11

## 2021-01-08 RX ORDER — CITALOPRAM HYDROBROMIDE 20 MG/1
20 TABLET ORAL DAILY
Qty: 90 TABLET | Refills: 1 | Status: SHIPPED | OUTPATIENT
Start: 2021-01-08 | End: 2021-07-05

## 2021-01-08 NOTE — TELEPHONE ENCOUNTER
Last time prescribed: 10/30/20 , 30 tabs/caps x 1 refills  Last office visit: 12/10/20  Next appointment: No Future Appointment Scheduled    Rosangela Reynoso RN  01/08/21  12:20 PM

## 2021-02-20 ENCOUNTER — HEALTH MAINTENANCE LETTER (OUTPATIENT)
Age: 36
End: 2021-02-20

## 2021-02-26 NOTE — PROGRESS NOTES
"ASSESSMENT AND PLAN:     COUNSELING:   Reviewed preventive health counseling, as reflected in patient instructions       Regular exercise       Healthy diet/nutrition       Contraception       Consider Hep C screening for all patients one time for ages 18-79 years    (Z01.419) Well woman exam  (primary encounter diagnosis)  Comment: Age appropriate screening and preventive services provided. Discussed breast cancer screening due to family history (maternal aunt and 1st cousin with breast cancer, no genetic testing done that Nicole knows of). I recommended deferring further screening to at least age 40 to reduce chances of false positives and unnecessary biopsies unless she notices changes in breast lumps. Deferring hep C screening until next lab draw because of low risk.   Plan:     (L30.9) Hand dermatitis  Comment: Most likely contact dermatitis. Trial of 2 weeks triamcinolone. Stop shea butter and switch to cetaphil. If not improving, will increase steroid potency. Advised to avoid getting on face/genitals.   Plan: triamcinolone (KENALOG) 0.1 % external ointment          (G47.00) Insomnia, unspecified type  Comment: Sending for CBT-i .  Plan: SLEEP EVALUATION & MANAGEMENT REFERRAL - [facility]         [phone] (Age 15 and up)          (F41.1) JOYA (generalized anxiety disorder)  Comment: Well controlled. Continue citalopram 20mg daily.   Plan:     Benito Duque MD  [facility]  03/01/2021, 2:44 PM      SUBJECTIVE:   Sonya Argueta is a 35 year old female who presents to clinic today for an annual wellness exam.    # Eczema  - itching and \"bumps under the skin\" on palms of hands, started about a month ago  - these have resolved but now having a lot of dryness on palms, thickened   - had similar issues in high school  - using shea butter moisturizer  - stopped using hand  when this started - itchiness improved but thicikened cracked skin on " "palms has continued  - new body soap since Mickie but uses this all over body  - does do dishes with hot water, doesn't wear gloves for this    # JOYA  - last seen for this 12/10/20, continued on Citalopram 20mg daily  - having \"some depressive symptoms\" but anxiety symptoms are pretty well controlled  - taking benadryl pretty much every night to help sleep  - doesn't have a problem falling asleep but wakes up right about 4.5 hours later  - has tried doing sleep meditation  - in the past was awake and felt like brain was super active or worrying, but this doesn't seem to be the case now    JOYA-7 SCORE 2017 10/30/2020 3/1/2021   Total Score - - -   Total Score - 21 (severe anxiety) -   Total Score 0 21 0       PHQ 2019 10/30/2020 3/1/2021   PHQ-9 Total Score 1 9 4   Q9: Thoughts of better off dead/self-harm past 2 weeks Not at all Not at all Not at all     # Health Maintenance  - HIV Screenin17 negative  - Hep C Screening: due for screening  - BP:   BP Readings from Last 3 Encounters:   21 125/81   12/10/20 111/74   19 114/77   - Cholesterol:   Recent Labs   Lab Test 01/08/15  0819   CHOL 156.0   HDL 52.0   LDL 89.0   TRIG 75.0     - Diabetes Screening: no indications for screening at this time  - Feels safe at home, denies verbal/physical/emotional abuse in past year: yes  - Last Pap: 6/10/16 Pap NIL, negative high-risk HPV  - Mammogram: 18, diagnostic mammogram and ultrasound of left breast - BIRADS 2  - Exercise: runs 2 miles a day 2-3 days a week on treadmill    Today's PHQ-2 Score:   PHQ-2 (  [employer]) 3/1/2021 12/10/2020   Q1: Little interest or pleasure in doing things 1 0   Q2: Feeling down, depressed or hopeless 0 0   PHQ-2 Score 1 0     Review of Systems:   Constitutional - no fevers, chills, night sweats, unintentional weight loss/gain   Eyes - no vision concerns   Ears/Nose/Throat - no hearing concerns, no dysphagia/odynophagia   Cardiovascular - no chest pain, " palpitations   Pulmonary - no shortness of breath, wheezing, coughing   GI - no abdominal pain, constipation, diarrhea, nausea, vomiting    - has recurrent BV - uses intermittent boric acid suppositories [facility]; no dysuria, polyuria, hematuria   Musculoskeletal - no joint or muscle pain  Integument - no rash   Neuro - no weakness, numbness, paresthesia   Heme - no easy bruising/bleeding   Endocrine - no polyuria, weight loss/gain, dry skin, excessive sweating, hair loss   Psychiatric - no feelings of depressed mood or anhedonia in past 2 weeks   Allergic/Immunologic - no history of anaphylaxis, no history of recurrent infections    Past Medical History:   Diagnosis Date     Depression      Fibroadenoma of breast 2009    left breast     Past Surgical History:   Procedure Laterality Date     pectoralis excavation  2002, 2009     TONSILLECTOMY  2009     Family History   Problem Relation Age of Onset     Coronary Artery Disease Paternal Grandmother 65     Breast Cancer Maternal Aunt 64     Breast Cancer Maternal Cousin 35     Valvular heart disease Father         stenotic valve     Dementia Maternal Grandmother      Cerebrovascular Disease Maternal Grandmother      Ovarian Cancer No family hx of      Diabetes No family hx of      Social History     Tobacco Use     Smoking status: Never Smoker     Smokeless tobacco: Never Used   Substance Use Topics     Alcohol use: Yes     Comment: socially     Drug use: No     Social History     Social History Narrative    [employer]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      I have reviewed the patient's past medical, surgical, family, and social history.     OBJECTIVE:   /81   Pulse 83   Temp 97.1  F (36.2  C)   Resp 14   Ht 1.67 m (5' 5.75\")   Wt 63.7 kg (140 lb 6.4 oz)   SpO2 97%   BMI 22.84 kg/m      Constitutional: well-appearing, appears stated age  Eyes: conjunctivae without erythema, sclera anicteric. Pupils equal, round, and reactive to light.   ENT: oropharynx clear, TM grey bilateral  Cardiac: regular rate and rhythm, normal S1/S2, no murmur/rubs/gallops  Respiratory: lungs clear to auscultation bilaterally, normal work of breathing, no wheezes/crackles  GI: abdomen soft, non-tender, non-distended  Extremities: warm and well perfused, radial pulses 2+ and equal, cap refill brisk.  Lymph: no cervical or supraclavicular lymphadenopathy  Skin: skin on hands slightly thickened, coarse at finger tips (all) and thenar crease where it is also cracking slightly.   Psych: affect is full and appropriate, speech is fluent and non-pressured  "

## 2021-03-01 ENCOUNTER — OFFICE VISIT (OUTPATIENT)
Dept: FAMILY MEDICINE | Facility: CLINIC | Age: 36
End: 2021-03-01
Payer: COMMERCIAL

## 2021-03-01 VITALS
DIASTOLIC BLOOD PRESSURE: 81 MMHG | RESPIRATION RATE: 14 BRPM | OXYGEN SATURATION: 97 % | SYSTOLIC BLOOD PRESSURE: 125 MMHG | BODY MASS INDEX: 22.56 KG/M2 | HEART RATE: 83 BPM | WEIGHT: 140.4 LBS | HEIGHT: 66 IN | TEMPERATURE: 97.1 F

## 2021-03-01 DIAGNOSIS — G47.00 INSOMNIA, UNSPECIFIED TYPE: ICD-10-CM

## 2021-03-01 DIAGNOSIS — Z01.419 WELL WOMAN EXAM: Primary | ICD-10-CM

## 2021-03-01 DIAGNOSIS — L30.9 HAND DERMATITIS: ICD-10-CM

## 2021-03-01 DIAGNOSIS — F41.1 GAD (GENERALIZED ANXIETY DISORDER): Chronic | ICD-10-CM

## 2021-03-01 RX ORDER — TRIAMCINOLONE ACETONIDE 1 MG/G
OINTMENT TOPICAL 2 TIMES DAILY
Qty: 30 G | Refills: 1 | Status: SHIPPED | OUTPATIENT
Start: 2021-03-01 | End: 2021-03-15

## 2021-03-01 ASSESSMENT — PATIENT HEALTH QUESTIONNAIRE - PHQ9: SUM OF ALL RESPONSES TO PHQ QUESTIONS 1-9: 4

## 2021-03-01 ASSESSMENT — MIFFLIN-ST. JEOR: SCORE: 1339.6

## 2021-03-01 ASSESSMENT — ANXIETY QUESTIONNAIRES
5. BEING SO RESTLESS THAT IT IS HARD TO SIT STILL: NOT AT ALL
6. BECOMING EASILY ANNOYED OR IRRITABLE: NOT AT ALL
2. NOT BEING ABLE TO STOP OR CONTROL WORRYING: NOT AT ALL
GAD7 TOTAL SCORE: 0
3. WORRYING TOO MUCH ABOUT DIFFERENT THINGS: NOT AT ALL
1. FEELING NERVOUS, ANXIOUS, OR ON EDGE: NOT AT ALL
7. FEELING AFRAID AS IF SOMETHING AWFUL MIGHT HAPPEN: NOT AT ALL
4. TROUBLE RELAXING: NOT AT ALL

## 2021-03-02 ASSESSMENT — ANXIETY QUESTIONNAIRES: GAD7 TOTAL SCORE: 0

## 2021-03-03 LAB
COPATH REPORT: NORMAL
PAP: NORMAL

## 2021-03-05 ENCOUNTER — TELEPHONE (OUTPATIENT)
Dept: FAMILY MEDICINE | Facility: CLINIC | Age: 36
End: 2021-03-05

## 2021-03-05 DIAGNOSIS — B97.7 HPV (HUMAN PAPILLOMA VIRUS) INFECTION: Primary | ICD-10-CM

## 2021-03-05 DIAGNOSIS — Z12.4 SCREENING FOR CERVICAL CANCER: Chronic | ICD-10-CM

## 2021-03-05 LAB
FINAL DIAGNOSIS: ABNORMAL
HPV HR 12 DNA CVX QL NAA+PROBE: NEGATIVE
HPV16 DNA SPEC QL NAA+PROBE: NEGATIVE
HPV18 DNA SPEC QL NAA+PROBE: POSITIVE
SPECIMEN DESCRIPTION: ABNORMAL
SPECIMEN SOURCE CVX/VAG CYTO: ABNORMAL

## 2021-03-05 NOTE — TELEPHONE ENCOUNTER
3/1/21 Pap NIL, +HPV 18  6/10/16 Pap NIL, neg HPV  Per 2019 ASCCP guidelines, recommended to have colposcopy  Will refer to gynecology  Called Nicole to walk through this    Benito Duque MD on 3/5/2021 at 1:36 PM

## 2021-04-07 ENCOUNTER — OFFICE VISIT (OUTPATIENT)
Dept: OBGYN | Facility: CLINIC | Age: 36
End: 2021-04-07
Attending: FAMILY MEDICINE
Payer: COMMERCIAL

## 2021-04-07 VITALS
BODY MASS INDEX: 22.83 KG/M2 | HEART RATE: 66 BPM | DIASTOLIC BLOOD PRESSURE: 76 MMHG | HEIGHT: 66 IN | SYSTOLIC BLOOD PRESSURE: 116 MMHG

## 2021-04-07 DIAGNOSIS — B97.7 HPV (HUMAN PAPILLOMA VIRUS) INFECTION: Primary | ICD-10-CM

## 2021-04-07 DIAGNOSIS — R87.810 CERVICAL HIGH RISK HUMAN PAPILLOMAVIRUS (HPV) DNA TEST POSITIVE: ICD-10-CM

## 2021-04-07 PROCEDURE — 88305 TISSUE EXAM BY PATHOLOGIST: CPT | Mod: 26 | Performed by: PATHOLOGY

## 2021-04-07 PROCEDURE — 88305 TISSUE EXAM BY PATHOLOGIST: CPT | Mod: TC | Performed by: OBSTETRICS & GYNECOLOGY

## 2021-04-07 PROCEDURE — 57454 BX/CURETT OF CERVIX W/SCOPE: CPT | Performed by: OBSTETRICS & GYNECOLOGY

## 2021-04-07 ASSESSMENT — PATIENT HEALTH QUESTIONNAIRE - PHQ9
5. POOR APPETITE OR OVEREATING: SEVERAL DAYS
SUM OF ALL RESPONSES TO PHQ QUESTIONS 1-9: 4

## 2021-04-07 ASSESSMENT — ANXIETY QUESTIONNAIRES
2. NOT BEING ABLE TO STOP OR CONTROL WORRYING: SEVERAL DAYS
5. BEING SO RESTLESS THAT IT IS HARD TO SIT STILL: NOT AT ALL
3. WORRYING TOO MUCH ABOUT DIFFERENT THINGS: SEVERAL DAYS
7. FEELING AFRAID AS IF SOMETHING AWFUL MIGHT HAPPEN: NOT AT ALL
GAD7 TOTAL SCORE: 5
1. FEELING NERVOUS, ANXIOUS, OR ON EDGE: SEVERAL DAYS
6. BECOMING EASILY ANNOYED OR IRRITABLE: SEVERAL DAYS

## 2021-04-07 NOTE — LETTER
4/7/2021       RE: Sonya Argueta  [address]  [address]  [place] [address]     Dear Colleague,    Thank you for referring your patient, Sonya Argueta, to the [facility] [place] [facility]. Please see a copy of my visit note below.    COLPOSCOPY PROCEDURE NOTE    35 year old  female presents for colposcopy.  Pap smear on 3/1/21 showed NILM/HPV 18 positive. Prior to this she has not abnormal Pap smears. Patient does not smoke.    Prior cervical/vaginal disease: none.  Prior cervical treatment: no treatment.    Procedure for colposcopy and biopsy has been explained to the patient.  Consent:  Risks, benefits of treatment, and no treatment were discussed.  Patient's questions were elicited and answered.  and Written consent signed and scanned into medical record.    PROCEDURE:  Speculum placed in vagina and excellent visualization of cervix acheived, cervix swabbed x 3 with acetic acid solution.    FINDINGS:  Colposcopy indequate (border of transformation zone poorly visualized from 1-3 o'clock).  Cervix: Small (<5 mm) mild acetowhite lesion noted at 3 o'clock; Biopsy taken from this spot as well as ECC performed.    ASSESSMENT: Low grade HPV changes v. Benign.    PLAN:   -Specimens sent to pathology  -Will base further treatment on pathology findings, tentatively cotesting in 1 year  -Post biopsy instructions given to patient  -Will call to discuss Pathology results when they are available  -Noted that patient tolerated the procedure very well and if excisional procedure were required, very reasonable for in-office LEEP excisional procedure based on size of lesion and patient tolerance.    Patient seen and procedure performed with Dr. Linda Cartwright MD  ObGyn Resident, PGY1  4/7/2021 1:54 PM    Patient was seen by the resident in [facility].  I reviewed the history & was present for the patient's exam and procedure.   The patient's assessment and plan were made jointly.    Vee Mckeon MD MPH

## 2021-04-07 NOTE — PROGRESS NOTES
COLPOSCOPY PROCEDURE NOTE    35 year old  female presents for colposcopy.  Pap smear on 3/1/21 showed NILM/HPV 18 positive. Prior to this she has not abnormal Pap smears. Patient does not smoke.    Prior cervical/vaginal disease: none.  Prior cervical treatment: no treatment.    Procedure for colposcopy and biopsy has been explained to the patient.  Consent:  Risks, benefits of treatment, and no treatment were discussed.  Patient's questions were elicited and answered.  and Written consent signed and scanned into medical record.    PROCEDURE:  Speculum placed in vagina and excellent visualization of cervix acheived, cervix swabbed x 3 with acetic acid solution.    FINDINGS:  Colposcopy indequate (border of transformation zone poorly visualized from 1-3 o'clock).  Cervix: Small (<5 mm) mild acetowhite lesion noted at 3 o'clock; Biopsy taken from this spot as well as ECC performed.    ASSESSMENT: Low grade HPV changes v. Benign.    PLAN:   -Specimens sent to pathology  -Will base further treatment on pathology findings, tentatively cotesting in 1 year  -Post biopsy instructions given to patient  -Will call to discuss Pathology results when they are available  -Noted that patient tolerated the procedure very well and if excisional procedure were required, very reasonable for in-office LEEP excisional procedure based on size of lesion and patient tolerance.    Patient seen and procedure performed with Dr. Linda Cartwright MD  ObGyn Resident, PGY1  4/7/2021 1:54 PM    Patient was seen by the resident in [facility].  I reviewed the history & was present for the patient's exam and procedure.  The patient's assessment and plan were made jointly.    Vee Mckeon MD MPH

## 2021-04-08 ASSESSMENT — ANXIETY QUESTIONNAIRES: GAD7 TOTAL SCORE: 5

## 2021-04-09 LAB — COPATH REPORT: NORMAL

## 2021-07-05 DIAGNOSIS — F41.1 GAD (GENERALIZED ANXIETY DISORDER): ICD-10-CM

## 2021-07-05 RX ORDER — CITALOPRAM HYDROBROMIDE 20 MG/1
20 TABLET ORAL DAILY
Qty: 90 TABLET | Refills: 1 | Status: SHIPPED | OUTPATIENT
Start: 2021-07-05

## 2021-07-05 NOTE — TELEPHONE ENCOUNTER
Last Office Visit: 3/1/21  Future [facility] Appointments: None  Medication last refilled: 1/8/21, #90 with 1 refill    Prescription approved per [facility] Refill Protocol.    Antonella Malhotra RN, BSN

## 2021-09-26 ENCOUNTER — HEALTH MAINTENANCE LETTER (OUTPATIENT)
Age: 36
End: 2021-09-26

## 2022-05-07 ENCOUNTER — HEALTH MAINTENANCE LETTER (OUTPATIENT)
Age: 37
End: 2022-05-07

## 2023-04-23 ENCOUNTER — HEALTH MAINTENANCE LETTER (OUTPATIENT)
Age: 38
End: 2023-04-23

## 2023-06-02 ENCOUNTER — HEALTH MAINTENANCE LETTER (OUTPATIENT)
Age: 38
End: 2023-06-02